# Patient Record
Sex: MALE
[De-identification: names, ages, dates, MRNs, and addresses within clinical notes are randomized per-mention and may not be internally consistent; named-entity substitution may affect disease eponyms.]

---

## 2018-07-30 ENCOUNTER — HOSPITAL ENCOUNTER (INPATIENT)
Dept: HOSPITAL 5 - ED | Age: 73
LOS: 1 days | Discharge: HOME | DRG: 69 | End: 2018-07-31
Attending: INTERNAL MEDICINE | Admitting: INTERNAL MEDICINE
Payer: MEDICARE

## 2018-07-30 DIAGNOSIS — Z90.89: ICD-10-CM

## 2018-07-30 DIAGNOSIS — Z79.899: ICD-10-CM

## 2018-07-30 DIAGNOSIS — G45.9: Primary | ICD-10-CM

## 2018-07-30 DIAGNOSIS — M17.0: ICD-10-CM

## 2018-07-30 DIAGNOSIS — Z91.040: ICD-10-CM

## 2018-07-30 DIAGNOSIS — I69.351: ICD-10-CM

## 2018-07-30 DIAGNOSIS — E87.1: ICD-10-CM

## 2018-07-30 LAB
APTT BLD: 32.7 SEC. (ref 24.2–36.6)
BASOPHILS # (AUTO): 0.1 K/MM3 (ref 0–0.1)
BASOPHILS NFR BLD AUTO: 1.2 % (ref 0–1.8)
BUN SERPL-MCNC: 16 MG/DL (ref 9–20)
BUN/CREAT SERPL: 20 %
CALCIUM SERPL-MCNC: 9.2 MG/DL (ref 8.4–10.2)
EOSINOPHIL # BLD AUTO: 0.1 K/MM3 (ref 0–0.4)
EOSINOPHIL NFR BLD AUTO: 1.3 % (ref 0–4.3)
HCT VFR BLD CALC: 42.3 % (ref 35.5–45.6)
HEMOLYSIS INDEX: 21
HGB BLD-MCNC: 15.7 GM/DL (ref 11.8–15.2)
INR PPP: 0.94 (ref 0.87–1.13)
LYMPHOCYTES # BLD AUTO: 1.3 K/MM3 (ref 1.2–5.4)
LYMPHOCYTES NFR BLD AUTO: 24.5 % (ref 13.4–35)
MCH RBC QN AUTO: 34 PG (ref 28–32)
MCHC RBC AUTO-ENTMCNC: 37 % (ref 32–34)
MCV RBC AUTO: 91 FL (ref 84–94)
MONOCYTES # (AUTO): 0.4 K/MM3 (ref 0–0.8)
MONOCYTES % (AUTO): 6.7 % (ref 0–7.3)
PLATELET # BLD: 152 K/MM3 (ref 140–440)
RBC # BLD AUTO: 4.64 M/MM3 (ref 3.65–5.03)

## 2018-07-30 PROCEDURE — 70544 MR ANGIOGRAPHY HEAD W/O DYE: CPT

## 2018-07-30 PROCEDURE — 85025 COMPLETE CBC W/AUTO DIFF WBC: CPT

## 2018-07-30 PROCEDURE — 85730 THROMBOPLASTIN TIME PARTIAL: CPT

## 2018-07-30 PROCEDURE — 85610 PROTHROMBIN TIME: CPT

## 2018-07-30 PROCEDURE — 85670 THROMBIN TIME PLASMA: CPT

## 2018-07-30 PROCEDURE — 82962 GLUCOSE BLOOD TEST: CPT

## 2018-07-30 PROCEDURE — 36415 COLL VENOUS BLD VENIPUNCTURE: CPT

## 2018-07-30 PROCEDURE — 70450 CT HEAD/BRAIN W/O DYE: CPT

## 2018-07-30 PROCEDURE — 80048 BASIC METABOLIC PNL TOTAL CA: CPT

## 2018-07-30 PROCEDURE — 93880 EXTRACRANIAL BILAT STUDY: CPT

## 2018-07-30 PROCEDURE — 93005 ELECTROCARDIOGRAM TRACING: CPT

## 2018-07-30 PROCEDURE — 95819 EEG AWAKE AND ASLEEP: CPT

## 2018-07-30 PROCEDURE — 70551 MRI BRAIN STEM W/O DYE: CPT

## 2018-07-30 PROCEDURE — 93306 TTE W/DOPPLER COMPLETE: CPT

## 2018-07-30 PROCEDURE — 80061 LIPID PANEL: CPT

## 2018-07-30 PROCEDURE — 84484 ASSAY OF TROPONIN QUANT: CPT

## 2018-07-30 PROCEDURE — 93010 ELECTROCARDIOGRAM REPORT: CPT

## 2018-07-30 NOTE — HISTORY AND PHYSICAL REPORT
History of Present Illness


Chief complaint: 





I felt weak


History of present illness: 


73 YO Male with CVA, OA presents to ED for evaluation. Pt states that he 

experienced acute onset of blurred vision, Facial weakness, slurred speech, 

right arm and right leg weakness. Pt symptoms resolved after about 30 minutes. 

Pt subsequently transported to [facility] for further care and evaluation. Pt denies 

fever, chills, CP, Palpitations, NVD, Syncope, Trauma, Seizure, loss of bowel 

or bladder continence. Pt seen and evaluated in ED. A code stroke was called. 

Pt found to have symptoms consistent with CVA. Pt outside therapeutic window 

for TPA. Pt admitted to telemetry and initiated on CVA protocol. 














Past History


Past Medical History: arthritis, stroke


Past Surgical History: tonsillectomy, Other (Left Femur surgery)


Social history: , lives with family


Family history: no significant family history (reviewed)





Medications and Allergies


 Allergies











Allergy/AdvReac Type Severity Reaction Status Date / Time


 


latex Allergy  Unknown Verified 07/30/18 12:44











 Home Medications











 Medication  Instructions  Recorded  Confirmed  Last Taken  Type


 


Lisinopril [Zestril TAB] 10 mg PO QDAY 07/30/18 07/30/18 07/30/18 History


 


Multivitamin [Multiple Vitamins] 1 each PO DAILY 07/30/18 07/30/18 07/30/18 

History














Review of Systems


Constitutional: no weight loss, no weight gain, no fever, no chills


Ears, nose, mouth and throat: no ear pain, no ear discharge, no tinnitis, no 

decreased hearing, no nose pain, no nasal congestion


Cardiovascular: no orthopnea, no palpitations, no rapid/irregular heart beat, 

no edema, no syncope, no lightheadedness


Respiratory: no cough, no cough with sputum, no excessive sputum, no hemoptysis

, no shortness of breath


Gastrointestinal: no abdominal pain, no nausea, no vomiting, no diarrhea, no 

constipation


Genitourinary Male: no hematuria, no flank pain, no discharge, no urinary 

frequency, no urinary hesitancy


Rectal: no pain, no incontinence, no bleeding


Musculoskeletal: no neck stiffness, no neck pain, no shooting arm pain, no arm 

numbness/tingling, no low back pain, no shooting leg pain


Integumentary: no rash, no pruritis, no redness, no sores, no wounds


Neurological: weakness, ataxia, change in speech, gait dysfunction, motor 

disturbance, no head injury, no seizures, no vertigo, no headaches, no migraines


Psychiatric: no anxiety, no memory loss, no change in sleep habits, no sleep 

disturbances, no insomnia, no hypersomnia, no change in appetite


Endocrine: no cold intolerance, no heat intolerance, no polyphagia, no 

excessive thirst, no polydipsia, no polyuria, no nocturia


Hematologic/Lymphatic: no easy bruising, no easy bleeding, no lymphadenopathy, 

no lymphedema


Allergic/Immunologic: no urticaria, no allergic rhinitis, no wheezing, no 

persistent infections, no anaphylaxis





Exam





- Constitutional


Vitals: 


 











Temp Pulse Resp BP Pulse Ox


 


 97.9 F   83   18   178/91   98 


 


 07/30/18 12:35  07/30/18 12:35  07/30/18 12:35  07/30/18 12:35  07/30/18 12:47











General appearance: Present: no acute distress, well-nourished





- EENT


Eyes: Present: PERRL


ENT: hearing intact, clear oral mucosa





- Neck


Neck: Present: supple, normal ROM





- Respiratory


Respiratory effort: normal


Respiratory: bilateral: CTA





- Cardiovascular


Heart Sounds: Present: S1 & S2.  Absent: rub, click





- Extremities


Extremities: pulses symmetrical, No edema


Peripheral Pulses: within normal limits





- Abdominal


General gastrointestinal: Present: soft, non-tender, non-distended, normal 

bowel sounds


Male genitourinary: Present: normal





- Integumentary


Integumentary: Present: clear, warm, dry





- Musculoskeletal


Musculoskeletal: right sided weakness





- Psychiatric


Psychiatric: appropriate mood/affect, intact judgment & insight





- Neurologic


Neurologic: CNII-XII intact, moves all extremities





Results





- Labs


CBC & Chem 7: 


 07/30/18 12:40





 07/30/18 12:40


Labs: 


 Abnormal lab results











  07/30/18 07/30/18 07/30/18 Range/Units





  12:39 12:40 12:40 


 


Hgb   15.7 H   (11.8-15.2)  gm/dl


 


MCH   34 H   (28-32)  pg


 


MCHC   37 H   (32-34)  %


 


Sodium    130 L  (137-145)  mmol/L


 


Chloride    94.1 L  ()  mmol/L


 


Carbon Dioxide    21 L  (22-30)  mmol/L


 


Glucose    146 H  ()  mg/dL


 


POC Glucose  122 H    ()  














Assessment and Plan





- Patient Problems


(1) CVA (cerebral vascular accident)


Current Visit: Yes   Status: Acute   


Qualifiers: 


   Precerebral and cerebral artery: middle cerebral artery   Laterality of 

affected vessel: left 


Plan to address problem: 


Stroke protocol: CT Brain, MRI Brain, MRA Brain, Echo, Carotid doppler, PT/OT/ 

Speech Therapy, Antiplatelet therapy, lipid panel, neuro checks, 








(2) Arthritis


Current Visit: Yes   Status: Acute   


Plan to address problem: 


Pain control, supportive care. 








(3) Hyponatremia syndrome


Current Visit: Yes   Status: Acute   


Plan to address problem: 


IVF resuscitation, repeat bmp. 








(4) DVT prophylaxis


Current Visit: Yes   Status: Acute   


Plan to address problem: 


SCD to BLE while in bed.

## 2018-07-30 NOTE — EMERGENCY DEPARTMENT REPORT
ED Neuro Deficit HPI





- General


Chief Complaint: Neuro Symptoms/Deficit


Stated Complaint: POSSIBLE STROKE


Time Seen by Provider: 07/30/18 12:55


Source: patient, family


Mode of arrival: Ambulatory


Limitations: No Limitations





- History of Present Illness


Initial Comments: 





This is a 72-year-old male who is unknown to this provider previously, has a 

past medical history of stroke, death in the right ear, mild residual right-

sided weakness.


His primary care doctor is Dr. Phil Kelsey.


He presents to the ER with a complaint of resolved speech disturbance, resolved 

slurred speech, and resolved weakness in the right upper extremity, and 

resolved numbness in the right lower extremity.  He has no complaints at this 

time.








-: Sudden


Location: speech, right arm, right leg


Presenting Symptoms: Present: Weak/Paralyzed One Side, Unable to Speak Clearly.

  Absent: Sudden, Severe Headache, Blurred/Loss of Vision, Facial Droop/Numbness

, Altered Mental Status


History of same: Yes


Place: home


Severity: moderate


Improves With: none


Worsens With: none


On Anticoagulants: Yes


Context: sudden onset


Associated Symptoms: weakness.  denies: confusion, chest pain, cough, 

diaphoresis, fever/chills, headaches, loss of appetite, malise, nausea/vomiting

, vertigo, seizures, shortness of breath, syncope





- Related Data


Allergies/Adverse Reactions: 


 Allergies











Allergy/AdvReac Type Severity Reaction Status Date / Time


 


latex Allergy  Unknown Verified 07/30/18 12:44














ED Review of Systems


ROS: 


Stated complaint: POSSIBLE STROKE


Other details as noted in HPI





Constitutional: denies: fever


Eyes: denies: eye discharge


ENT: denies: epistaxis


Respiratory: denies: cough


Cardiovascular: denies: chest pain


Genitourinary: denies: dysuria


Musculoskeletal: denies: back pain


Skin: denies: lesions


Neurological: weakness


Psychiatric: anxiety





ED Past Medical Hx





- Past Medical History


Hx CVA: Yes (2008- mild weakness right side)


Hx Arthritis: Yes (bilateral knees)


Additional medical history: deaf right ear,hearing aid left ear





- Surgical History


Additional Surgical History: left femur,tonsillectomy





- Social History


Smoking Status: Never Smoker


Substance Use Type: None





ED Neuro Physical Exam





- General


Limitations: No Limitations


General appearance: alert, in no apparent distress


Suspected Stroke: Yes





- Head


Head exam: Present: atraumatic, normocephalic





- Eye


Eye exam: Present: normal appearance, EOMI.  Absent: nystagmus





- ENT


ENT exam: Present: normal exam, normal orophraynx, mucous membranes moist, 

normal external ear exam





- Neck


Neck exam: Present: normal inspection, full ROM.  Absent: tenderness, 

meningismus





- Respiratory


Respiratory exam: Present: normal lung sounds bilaterally.  Absent: respiratory 

distress





- Cardiovascular


Cardiovascular Exam: Present: regular rate, normal rhythm, normal heart sounds.

  Absent: bradycardia, tachycardia, irregular rhythm, systolic murmur, 

diastolic murmur, rubs, gallop





- GI/Abdominal


GI/Abdominal exam: Present: soft.  Absent: distended, tenderness, guarding, 

rebound, rigid, pulsatile mass





- Rectal


Rectal exam: Present: deferred





- Extremities Exam


Extremities exam: Present: normal inspection, full ROM, normal capillary refill

, other (2+ pulses noted in the bilateral upper, lower extremities.  

Compartments soft.  No long bony tenderness.  The pelvis is stable.).  Absent: 

tenderness, pedal edema, joint swelling, calf tenderness





- Back Exam


Back exam: Present: normal inspection, full ROM.  Absent: tenderness, CVA 

tenderness (R), paraspinal tenderness, vertebral tenderness





- Neurological Exam


Neurological exam: Present: alert, oriented X3, CN II-XII intact, normal gait, 

other (Extraocular movements intact.  Tongue midline.  No facial droop.  Facial 

sensation intact to light touch in the V1, V2, V3 distribution bilaterally.  5 

and 5 strength in 4 extremities..  Sensation is intact to light touch in 4 

extremities.).  Absent: motor sensory deficit





- NIHSS


Assessment Interval: Baseline


1a. Level of Consciousness: alert


1b. LOC Questions: answers correctly


1c. LOC Commands: performs tasks correctly


2. Best Gaze: normal


3. Visual: no visual loss


4. Facial Palsy: normal symmetrical movement


5b. Motor Arm Right: no drift


5a. Motor Arm Left: no drift


6a. Motor Leg Left: no drift


6b. Motor Leg Right: no drift


7. Limb Ataxia: absent


8. Sensory: normal


9. Best Language: no aphasia


10. Dysarthria: normal


11. Extinction/Inattention: no abnormality


Total Score: 0


Stroke Severity: No Stroke Symptoms





- Psychiatric


Psychiatric exam: Present: normal affect, normal mood





- Skin


Skin exam: Present: warm, dry, intact, normal color.  Absent: rash





ED Course


 Vital Signs











  07/30/18 07/30/18 07/30/18





  12:35 12:46 12:47


 


Temperature 97.9 F  


 


Pulse Rate 83  


 


Respiratory 18  





Rate   


 


Blood Pressure 178/91  


 


O2 Sat by Pulse 98 97 98





Oximetry   














- Reevaluation(s)


Reevaluation #1: 





07/30/18 14:10


Dr. Schwartz accepted  the patient to the medical service.


Reevaluation #2: 





07/30/18 14:19

















07/30/18 14:20








Case is discussed with consult to neurology, Dr. ORTIZ Arriola, who is in agreement 

that the patient does not meet TPA criteria and does not require emergent 

endovascular intervention.  He does recommend every 2 hours neuro checks for 

the next 12 hours.








- Lab Data


Result diagrams: 


 07/30/18 12:40





 07/30/18 12:40


 Lab Results











  07/30/18 07/30/18 07/30/18 Range/Units





  12:39 12:40 12:40 


 


WBC   5.3   (4.5-11.0)  K/mm3


 


RBC   4.64   (3.65-5.03)  M/mm3


 


Hgb   15.7 H   (11.8-15.2)  gm/dl


 


Hct   42.3   (35.5-45.6)  %


 


MCV   91   (84-94)  fl


 


MCH   34 H   (28-32)  pg


 


MCHC   37 H   (32-34)  %


 


RDW   13.5   (13.2-15.2)  %


 


Plt Count   152   (140-440)  K/mm3


 


Lymph % (Auto)   24.5   (13.4-35.0)  %


 


Mono % (Auto)   6.7   (0.0-7.3)  %


 


Eos % (Auto)   1.3   (0.0-4.3)  %


 


Baso % (Auto)   1.2   (0.0-1.8)  %


 


Lymph #   1.3   (1.2-5.4)  K/mm3


 


Mono #   0.4   (0.0-0.8)  K/mm3


 


Eos #   0.1   (0.0-0.4)  K/mm3


 


Baso #   0.1   (0.0-0.1)  K/mm3


 


Seg Neutrophils %   66.3   (40.0-70.0)  %


 


Seg Neutrophils #   3.5   (1.8-7.7)  K/mm3


 


PT    13.0  (12.2-14.9)  Sec.


 


INR    0.94  (0.87-1.13)  


 


APTT    32.7  (24.2-36.6)  Sec.


 


Thrombin Time    17.2  (15.1-19.6)  Sec.


 


Sodium     (137-145)  mmol/L


 


Potassium     (3.6-5.0)  mmol/L


 


Chloride     ()  mmol/L


 


Carbon Dioxide     (22-30)  mmol/L


 


Anion Gap     mmol/L


 


BUN     (9-20)  mg/dL


 


Creatinine     (0.8-1.5)  mg/dL


 


Estimated GFR     ml/min


 


BUN/Creatinine Ratio     %


 


Glucose     ()  mg/dL


 


POC Glucose  122 H    ()  


 


Calcium     (8.4-10.2)  mg/dL


 


Troponin T     (0.00-0.029)  ng/mL














  07/30/18 Range/Units





  12:40 


 


WBC   (4.5-11.0)  K/mm3


 


RBC   (3.65-5.03)  M/mm3


 


Hgb   (11.8-15.2)  gm/dl


 


Hct   (35.5-45.6)  %


 


MCV   (84-94)  fl


 


MCH   (28-32)  pg


 


MCHC   (32-34)  %


 


RDW   (13.2-15.2)  %


 


Plt Count   (140-440)  K/mm3


 


Lymph % (Auto)   (13.4-35.0)  %


 


Mono % (Auto)   (0.0-7.3)  %


 


Eos % (Auto)   (0.0-4.3)  %


 


Baso % (Auto)   (0.0-1.8)  %


 


Lymph #   (1.2-5.4)  K/mm3


 


Mono #   (0.0-0.8)  K/mm3


 


Eos #   (0.0-0.4)  K/mm3


 


Baso #   (0.0-0.1)  K/mm3


 


Seg Neutrophils %   (40.0-70.0)  %


 


Seg Neutrophils #   (1.8-7.7)  K/mm3


 


PT   (12.2-14.9)  Sec.


 


INR   (0.87-1.13)  


 


APTT   (24.2-36.6)  Sec.


 


Thrombin Time   (15.1-19.6)  Sec.


 


Sodium  130 L  (137-145)  mmol/L


 


Potassium  4.5  (3.6-5.0)  mmol/L


 


Chloride  94.1 L  ()  mmol/L


 


Carbon Dioxide  21 L  (22-30)  mmol/L


 


Anion Gap  19  mmol/L


 


BUN  16  (9-20)  mg/dL


 


Creatinine  0.8  (0.8-1.5)  mg/dL


 


Estimated GFR  > 60  ml/min


 


BUN/Creatinine Ratio  20  %


 


Glucose  146 H  ()  mg/dL


 


POC Glucose   ()  


 


Calcium  9.2  (8.4-10.2)  mg/dL


 


Troponin T  < 0.010  (0.00-0.029)  ng/mL














- EKG Data


-: EKG Interpreted by Me


EKG shows normal: sinus rhythm, intervals, QRS complexes


When compared to previous EKG there are: previous EKG unavailable





- Radiology Data


Radiology results: report reviewed, image reviewed


Noncontrast CT scan of the brain is negative for acute disease





- Medical Decision Making





Differential diagnosis, including but not limited to: Transient ischemic attack

, stroke, Mino's paralysis











Assessment and plan: 72-year-old male with resolved right-sided numbness, 

weakness and resolved slurred speech.  Has an NIH score of 0.  Therefore does 

not require TPA.  Also, given his resolution of symptoms and his normal 

examination at this time, does not require emergent endovascular imaging at 

this time.  Patient has a high vascular risk profile, and will be admitted to 

the hospital for presumed transient ischemic attack.  The hospital physician is 

paged to arrange admission.








- Core Measures


Measure Exclusions: not indicated





- Thrombolytic Inclusion/Exclusion


Thrombolytic Contraindications: Rapidily Improving s/s


Critical care attestation.: 


If time is entered above; I have spent that time in minutes in the direct care 

of this critically ill patient, excluding procedure time.








ED Disposition


Clinical Impression: 


 TIA (transient ischemic attack)





Disposition: DC-09 OP ADMIT IP TO THIS HOSP


Is pt being admited?: Yes


Does the pt Need Aspirin: Yes


Condition: Good


Referrals: 


PHIL KELSEY MD [Primary Care Provider] - 3-5 Days

## 2018-07-30 NOTE — CAT SCAN REPORT
FINAL REPORT



EXAM:  CT HEAD/BRAIN WO CON



HISTORY:  neuro deficits &lt; 6hrs or sx present upon awakening





TECHNIQUE:  CT of the head was performed.  No intravenous

contrast was administered.





PRIORS:  None.



FINDINGS:  

There is an old left thalamic lacunar infarct.



There is no evidence of intracranial hemorrhage.



There is no edema, mass effect or midline shift.



There are no abnormal extra-axial fluid collections.



The ventricles are appropriate for brain volume.



There is no skull fracture seen.



The visualized aspects of the sinuses are clear.



IMPRESSION:  

There is no acute intracranial abnormality identified.

## 2018-07-31 VITALS — SYSTOLIC BLOOD PRESSURE: 121 MMHG | DIASTOLIC BLOOD PRESSURE: 73 MMHG

## 2018-07-31 LAB — HDLC SERPL-MCNC: 34 MG/DL (ref 40–59)

## 2018-07-31 NOTE — DISCHARGE SUMMARY
Providers





- Providers


Date of Admission: 


07/30/18 14:07





Date of discharge: 07/31/18


Attending physician: 


LIBRADO ZARAGOZA





 





07/30/18 12:57


Consult to Physician [CONS] Urgent 


   Comment: 


   Consulting Provider: ALMA SPRINGER


   Physician Instructions: 


   Reason For Exam: tia





07/30/18 14:07


Occupational Therapy Evaluate and Treat [CONS] Routine 


   Comment: 


   Reason For Exam: Neuro deficits


Physical Therapy Evaluation and Treat [CONS] Routine 


   Comment: 


   Reason For Exam: Neuro deficits





07/30/18 14:08


Speech Therapy Evaluation and Treat [CONS] Routine 


   Reason For Exam: swallow eval











Primary care physician: 


GIOVANI KELSEY








Hospitalization


Condition: Good


Disposition: DC-01 TO HOME OR SELFCARE





- Discharge Diagnoses


(1) TIA (transient ischemic attack)


Status: Acute   





Core Measure Documentation





- Palliative Care


Palliative Care/ Comfort Measures: Not Applicable





- Core Measures


Any of the following diagnoses?: none





Exam





- Constitutional


Vitals: 


 











Temp Pulse Resp BP Pulse Ox


 


 97.9 F   67   20   121/73   97 


 


 07/31/18 09:02  07/31/18 09:02  07/31/18 09:02  07/31/18 09:02  07/31/18 10:00














Plan


Activity: no restrictions


Diet: low fat, low cholesterol


Additional Instructions: 1.Follow up with PCP in 1 week.


Follow up with: 


GIOVANI KELSEY MD [Primary Care Provider] - 3-5 Days


Prescriptions: 


Aspirin EC [Aspirin Enteric Coated TAB] 325 mg PO QDAY #30 tablet.

## 2018-07-31 NOTE — MAGNETIC RESONANCE REPORT
MRI OF THE BRAIN WITHOUT CONTRAST:



HISTORY: Stroke



PROCEDURE:

Multiplanar, multisequence MR imaging of the brain without IV contrast 

was

performed.  



FINDINGS:

CT head performed 7/30/18 was reviewed. MRI demonstrates mild cortical 

volume loss and mild nonspecific chronic white matter changes which are 

probably appropriate for this persons age. 7 mm chronic lacunar infarct 

in the posterior left thalamus is also noted.



No evidence for acute ischemia, hemorrhage or mass.  No extra-axial 

fluid collection.



The midline structures are central.  The basal cisterns are patent. 

Normal ventricular size.



The orbital cavities and sella turcica demonstrate no abnormality.



The visualized paranasal sinuses and mastoid air cells are well aerated.



IMPRESSION:

No acute process.

Mild volume loss and chronic white matter changes.

Chronic lacunar infarct in the left thalamus.

## 2018-07-31 NOTE — MAGNETIC RESONANCE REPORT
MRA HEAD WITHOUT CONTRAST



HISTORY: Stroke.



Time-of-flight imaging with MIP reformations of the [place]

[place] is submitted.



The arteries appear widely patent and free of hemodynamically

significant stenosis, aneurysm or dissection.  Fetal origin of the 

right PCA is noted.



IMPRESSION:

Normal variant MRA head. No hemodynamically significant stenosis or 

occlusion is identified.

## 2018-09-17 ENCOUNTER — HOSPITAL ENCOUNTER (OUTPATIENT)
Dept: HOSPITAL 5 - OR | Age: 73
Discharge: HOME | End: 2018-09-17
Attending: UROLOGY
Payer: MEDICARE

## 2018-09-17 VITALS — DIASTOLIC BLOOD PRESSURE: 86 MMHG | SYSTOLIC BLOOD PRESSURE: 159 MMHG

## 2018-09-17 DIAGNOSIS — Z87.891: ICD-10-CM

## 2018-09-17 DIAGNOSIS — M19.90: ICD-10-CM

## 2018-09-17 DIAGNOSIS — I10: ICD-10-CM

## 2018-09-17 DIAGNOSIS — Z79.899: ICD-10-CM

## 2018-09-17 DIAGNOSIS — Z91.040: ICD-10-CM

## 2018-09-17 DIAGNOSIS — Z79.82: ICD-10-CM

## 2018-09-17 DIAGNOSIS — Z86.73: ICD-10-CM

## 2018-09-17 DIAGNOSIS — E78.00: ICD-10-CM

## 2018-09-17 DIAGNOSIS — N13.2: Primary | ICD-10-CM

## 2018-09-17 DIAGNOSIS — N40.0: ICD-10-CM

## 2018-09-17 DIAGNOSIS — E11.9: ICD-10-CM

## 2018-09-17 DIAGNOSIS — K21.9: ICD-10-CM

## 2018-09-17 DIAGNOSIS — Z98.890: ICD-10-CM

## 2018-09-17 PROCEDURE — C2617 STENT, NON-COR, TEM W/O DEL: HCPCS

## 2018-09-17 PROCEDURE — C1726 CATH, BAL DIL, NON-VASCULAR: HCPCS

## 2018-09-17 PROCEDURE — 74420 UROGRAPHY RTRGR +-KUB: CPT

## 2018-09-17 PROCEDURE — A4217 STERILE WATER/SALINE, 500 ML: HCPCS

## 2018-09-17 PROCEDURE — 52356 CYSTO/URETERO W/LITHOTRIPSY: CPT

## 2018-09-17 PROCEDURE — C1769 GUIDE WIRE: HCPCS

## 2018-09-17 PROCEDURE — C1758 CATHETER, URETERAL: HCPCS

## 2018-09-17 PROCEDURE — 82962 GLUCOSE BLOOD TEST: CPT

## 2018-09-17 NOTE — ANESTHESIA CONSULTATION
Anesthesia Consult and Med Hx


Date of service: 09/17/18





- Airway


Anesthetic Teeth Evaluation: Dentures


ROM Head & Neck: Adequate


Mallampati Class: Class II


Intubation Access Assessment: Probably Good





- Pulmonary Exam


CTA: Yes





- Cardiac Exam


Cardiac Exam: RRR





- Pre-Operative Health Status


ASA Pre-Surgery Classification: ASA3


Proposed Anesthetic Plan: General





- Pre-Anesthesia Comment


Pre-Anesthesia Comments: micrognathia, redundant tissue; very active- yard work

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





- Pulmonary


Hx Smoking: Yes (STOPPED X 40 YRS, 3PPD X 20 YRS)


Hx Asthma: No


Hx Respiratory Symptoms: No


SOB: No


COPD: No


Home Oxygen Therapy: No


Hx Pneumonia: No


Hx Sleep Apnea: No (NATY PRE SCREEN HIGH RISK)





- Cardiovascular System


Hx Hypertension: Yes (X 8 YRS; hyperlipidemia)


Hx Coronary Artery Disease: No


Hx Heart Attack/AMI: No


Hx Angina: No


Hx Percutaneous Transluminal Coronary Angioplasty (PTCA): No


Hx Cardia Arrhythmia: No


Hx Pacemaker: No


Hx Internal Defibrillator: No


Hx Valvular Heart Disease: No


Hx Heart Murmur: No





- Central Nervous System


CVA: Yes (;occasionally uses a cane)


Hx Back Pain: No


Hx Psychiatric Problems: No





- Gastrointestinal


Hx Gastroesophageal Reflux Disease: No (food related but very rare)





- Endocrine


Hx Renal Disease: No


Hx End Stage Renal Disease: No


Hx Cirrhosis: No


Hx Liver Disease: No


Hx Insulin Dependent Diabetes: No


Hx Non-Insulin Dependent Diabetes: No


Hx Thyroid Disease: No


Hx Hypothyroidism: No


Hx Hyperthyroidism: No





- Hematic


Hx Anemia: No


Hx Sickle Cell Disease: No





- Other Systems


Hx Alcohol Use: No


Hx Substance Use: No


Hx Cancer: No


Hx Obesity: No

## 2018-09-17 NOTE — POST OPERATIVE NOTE
Date of procedure: 09/17/18


Pre-op diagnosis: left ureteralstone 


Post-op diagnosis: same


Findings: 





as above 


Procedure: 





cysto ureteroscopy laser extraxction 


Anesthesia: GETA


Surgeon: ROSA TOMLINSON


Estimated blood loss: none


Pathology: list (stone)


Specimen disposition: given to patient/family


Condition: stable


Disposition: PACU

## 2018-09-17 NOTE — DISCHARGE SUMMARY
Short Stay Discharge Plan


Activity: other (no straining )


Weight Bearing Status: Full Weight Bearing


Diet: low fat, low salt


Special Instructions: other (has stent )


Durable Medical Equipment Needed Upon Discharge: other (stent)


Follow up with: 


GIOVANI KELSEY MD [Primary Care Provider] - 7 Days


ROSA TOMLINSON MD [Staff Physician] - 7 Days

## 2018-09-17 NOTE — OPERATIVE REPORT
PREOPERATIVE DIAGNOSIS:  Large left distal ureteral stone.



POSTOPERATIVE DIAGNOSIS:  Large left distal ureteral stone.



PROCEDURES:  Cystoscopy, left retrograde, left ureteroscopy, laser of stone,

stone extraction, J stent.



SURGEON:  Raghu Winn MD



ANESTHESIA:  General.



FINDINGS:  The gentleman with a large stone, left hydronephrosis.  Minimal pain.

 The stone is over 8 mm in the distal ureter, now presents for treatment.



DESCRIPTION OF PROCEDURE:  The patient brought to the operating room and placed

on the operating table.  Following induction of anesthesia, placed in lithotomy

position, prepped and draped in usual sterile fashion.  Cystoscopy showed no

bladder tumors.  Urethra was normal.  Retrograde showed a large stone in the

distal ureter.  The balloon dilatation was carried out.  Stone was way too big

to extract.  It had multiple spicules on it.  With the laser, we broke it into

about 10 pieces and they were extracted.  The patient tolerated the procedure

well.  A 6-[place] double J coiled in the kidney.  The patient tolerated the

procedure well and brought to recovery in stable condition.  Stone will be given

to the family.  Family notified.





DD: 09/17/2018 09:26

DT: 09/17/2018 09:52

JOB# [id number]  [id number]

MILAN/GEN

## 2018-09-18 NOTE — FLUOROSCOPY REPORT
FLUOROSCOPY RETROGRADE UROGRAPHY:

FLUOROSCOPY URETER/NEPHROSTOMY DILATATION LEFT:



HISTORY: Left ureteral stone.



FINDINGS: Fluoroscopy was provided by radiology during retrograde 

urography by the urologist. 6 fluoroscopic images were captured.



The images demonstrate an approximate 9 mm distal left ureteral stone. 

Left ureteroscopy was performed. A holmium laser was utilized. Stone 

fragments were removed with a grasper. A left ureteral stent was placed 

which adequately drains the left collecting system. No images of the 

right pyelogram are presented.



IMPRESSION: Distal left ureteral stone removal. Left ureteral stent 

placement.

## 2019-07-17 ENCOUNTER — HOSPITAL ENCOUNTER (EMERGENCY)
Dept: HOSPITAL 5 - ED | Age: 74
LOS: 1 days | Discharge: HOME | End: 2019-07-18
Payer: MEDICARE

## 2019-07-17 DIAGNOSIS — Z87.891: ICD-10-CM

## 2019-07-17 DIAGNOSIS — Z79.899: ICD-10-CM

## 2019-07-17 DIAGNOSIS — Z79.82: ICD-10-CM

## 2019-07-17 DIAGNOSIS — Z88.8: ICD-10-CM

## 2019-07-17 DIAGNOSIS — Z90.89: ICD-10-CM

## 2019-07-17 DIAGNOSIS — Z86.73: ICD-10-CM

## 2019-07-17 DIAGNOSIS — Z91.040: ICD-10-CM
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
## 2019-07-17 DIAGNOSIS — T78.3XXA: Primary | ICD-10-CM

## 2019-07-17 DIAGNOSIS — E11.9: ICD-10-CM

## 2019-07-17 DIAGNOSIS — Z87.442: ICD-10-CM

## 2019-07-17 DIAGNOSIS — M19.90: ICD-10-CM

## 2019-07-17 DIAGNOSIS — I10: ICD-10-CM

## 2019-07-17 PROCEDURE — 85025 COMPLETE CBC W/AUTO DIFF WBC: CPT

## 2019-07-17 PROCEDURE — 80048 BASIC METABOLIC PNL TOTAL CA: CPT

## 2019-07-17 PROCEDURE — 96374 THER/PROPH/DIAG INJ IV PUSH: CPT

## 2019-07-17 PROCEDURE — 36415 COLL VENOUS BLD VENIPUNCTURE: CPT

## 2019-07-17 PROCEDURE — 99283 EMERGENCY DEPT VISIT LOW MDM: CPT

## 2019-07-17 PROCEDURE — 96375 TX/PRO/DX INJ NEW DRUG ADDON: CPT

## 2019-07-17 NOTE — EMERGENCY DEPARTMENT REPORT
ED Allergic Reaction HPI





- General


Stated complaint: ALLERGIC REACTION


Time Seen by Provider: 07/17/19 23:24





- History of Present Illness


Initial Comments: 





Patient is 73 years old male with history of CVA, hypertension and diabetes.  

Patient presented to the ER complaining of lower lip swelling started this 

evening.  Patient denied any shortness of breath or difficulty swallowing.  

Patient stated the symptoms started after he started drinking his fluid 

preparation for his colonoscopy tomorrow.  Patient also taking lisinopril for 

hypertension.  Patient is alert, oriented and in no acute distress.


MD Complaint: allergic reaction, facial swelling


Exposure: medication


Symptoms: lip swelling


Severity: moderate


Treatment Prior to Arrival: none


Previous Allergy History: none





- Related Data


                                Home Medications











 Medication  Instructions  Recorded  Confirmed  Last Taken


 


Lisinopril [Zestril TAB] 20 mg PO PRN PRN 07/30/18 09/14/18 09/16/18


 


Multivitamin [Multiple Vitamins] 1 each PO DAILY 07/30/18 09/17/18 09/13/18


 


Acetaminophen [Tylenol Extra 500 mg PO PRN PRN 09/14/18 09/17/18 09/14/18





Strength]    


 


Aspirin EC 81 mg PO QDAY 09/14/18 09/14/18 09/13/18


 


Cholecalciferol (Vitamin D3) 1,000 unit PO DAILY 09/14/18 09/14/18 09/13/18





[Vitamin D3]    


 


Cranberry Conc/Ascorbic Acid 1 each PO DAILY 09/14/18 09/14/18 09/13/18





[Cranberry Concentrate Softgel]    


 


Diazepam [Valium] 5 mg PO PRN PRN 09/14/18 09/17/18 09/12/18


 


Diclofenac Dr [Voltaren Dr] 75 mg PO DAILY 09/14/18 09/14/18 09/13/18


 


Omega-3/Dha/Epa/Fish Oil [Fish Oil 1 cap PO DAILY 09/14/18 09/14/18 09/13/18





1,000 mg Softgel]    


 


Vitamin B Complex [Super B-50 1 each PO DAILY 09/14/18 09/14/18 09/13/18





Complex]    








                                  Previous Rx's











 Medication  Instructions  Recorded  Last Taken  Type


 


AtorvaSTATin [Lipitor] 20 mg PO QHS #30 tab 07/31/18 09/16/18 Rx











                                    Allergies











Allergy/AdvReac Type Severity Reaction Status Date / Time


 


bisacodyl [From Biscolax] Allergy  Angioedema Verified 07/17/19 23:28


 


latex Allergy  SKIN Verified 09/14/18 13:07





   BLISTERS  


 


lisinopril Allergy  Angioedema Verified 07/17/19 23:28














ED Review of Systems


ROS: 


Stated complaint: ALLERGIC REACTION


Other details as noted in HPI





Comment: All other systems reviewed and negative


Constitutional: denies: chills, fever


Respiratory: denies: cough, orthopnea, shortness of breath, SOB with exertion, 

SOB at rest, wheezing


Cardiovascular: denies: chest pain, palpitations, dyspnea on exertion


Gastrointestinal: denies: abdominal pain, nausea, vomiting, diarrhea, 

constipation, hematemesis


Genitourinary: denies: urgency


Musculoskeletal: denies: back pain


Neurological: denies: headache, weakness, numbness, paresthesias, confusion, 

abnormal gait





ED Past Medical Hx





- Past Medical History


Hx Hypertension: Yes (X 8 YRS; hyperlipidemia)


Hx CVA: Yes (2008- mild weakness right side)


Hx Heart Attack/AMI: No


Hx Diabetes: Yes (DIET CONTROLLED)


Hx Liver Disease: No


Hx Renal Disease: No


Hx Sickle Cell Disease: No


Hx Arthritis: Yes


Hx Kidney Stones: Yes


Hx Asthma: No


Hx COPD: No


Hx HIV: No


Additional medical history: deaf right ear,hearing aid left ear





- Surgical History


Hx Pacemaker: No


Hx Internal Defibrillator: No


Additional Surgical History: left femur,tonsillectomy





- Social History


Smoking Status: Former Smoker





- Medications


Home Medications: 


                                Home Medications











 Medication  Instructions  Recorded  Confirmed  Last Taken  Type


 


Lisinopril [Zestril TAB] 20 mg PO PRN PRN 07/30/18 09/14/18 09/16/18 History


 


Multivitamin [Multiple Vitamins] 1 each PO DAILY 07/30/18 09/17/18 09/13/18 

History


 


AtorvaSTATin [Lipitor] 20 mg PO QHS #30 tab 07/31/18 09/14/18 09/16/18 Rx


 


Acetaminophen [Tylenol Extra 500 mg PO PRN PRN 09/14/18 09/17/18 09/14/18 

History





Strength]     


 


Aspirin EC 81 mg PO QDAY 09/14/18 09/14/18 09/13/18 History


 


Cholecalciferol (Vitamin D3) 1,000 unit PO DAILY 09/14/18 09/14/18 09/13/18 

History





[Vitamin D3]     


 


Cranberry Conc/Ascorbic Acid 1 each PO DAILY 09/14/18 09/14/18 09/13/18 History





[Cranberry Concentrate Softgel]     


 


Diazepam [Valium] 5 mg PO PRN PRN 09/14/18 09/17/18 09/12/18 History


 


Diclofenac Dr [Voltaren Dr] 75 mg PO DAILY 09/14/18 09/14/18 09/13/18 History


 


Omega-3/Dha/Epa/Fish Oil [Fish Oil 1 cap PO DAILY 09/14/18 09/14/18 09/13/18 

History





1,000 mg Softgel]     


 


Vitamin B Complex [Super B-50 1 each PO DAILY 09/14/18 09/14/18 09/13/18 History





Complex]     














ED Physical Exam





- General


General appearance: alert, in no apparent distress





- Head


Head exam: Present: atraumatic, normocephalic, normal inspection





- Eye


Eye exam: Present: normal appearance, PERRL


Pupils: Present: normal accommodation





- ENT


ENT exam: Present: mucous membranes moist, other (lower lip swelling)





- Neck


Neck exam: Present: normal inspection, full ROM.  Absent: tenderness, 

meningismus, lymphadenopathy, thyromegaly





- Respiratory


Respiratory exam: Present: normal lung sounds bilaterally





- Cardiovascular


Cardiovascular Exam: Present: regular rate, normal rhythm, normal heart sounds





- GI/Abdominal


GI/Abdominal exam: Present: soft, normal bowel sounds.  Absent: distended, 

tenderness, guarding, rebound, rigid, organomegaly, mass, bruit, pulsatile mass,

hernia





- Extremities Exam


Extremities exam: Present: normal inspection, full ROM, normal capillary refill.

 Absent: calf tenderness





- Back Exam


Back exam: Present: normal inspection, full ROM.  Absent: CVA tenderness (R), 

CVA tenderness (L), muscle spasm, paraspinal tenderness, vertebral tenderness





- Neurological Exam


Neurological exam: Present: alert, oriented X3, CN II-XII intact





- Psychiatric


Psychiatric exam: Present: normal mood





- Skin


Skin exam: Present: warm, intact, normal color





ED Course


                                   Vital Signs











  07/17/19 07/17/19 07/17/19





  23:06 23:16 23:28


 


Temperature   98.2 F


 


Pulse Rate 74 69 68


 


Respiratory 21 18 18





Rate   


 


Blood Pressure   


 


O2 Sat by Pulse 98 99 98





Oximetry   














  07/17/19 07/17/19 07/17/19





  23:30 23:32 23:45


 


Temperature   


 


Pulse Rate 66  64


 


Respiratory 11 L 16 10 L





Rate   


 


Blood Pressure 136/69  136/69


 


O2 Sat by Pulse 99  99





Oximetry   














  07/18/19





  00:00


 


Temperature 


 


Pulse Rate 64


 


Respiratory 11 L





Rate 


 


Blood Pressure 145/74


 


O2 Sat by Pulse 98





Oximetry 














ED Medical Decision Making





- Lab Data


Result diagrams: 


                                 07/17/19 23:41





                                 07/17/19 23:41





- Medical Decision Making





Patient is 73 years old male with history of CVA, hypertension and diabetes.  

Patient presented to the ER complaining of lower lip swelling started this 

evening.  Patient denied any shortness of breath or difficulty swallowing.  

Patient stated the symptoms started after he started drinking his fluid 

preparation for his colonoscopy tomorrow.  Patient also taking lisinopril for 

hypertension.  Patient is alert, oriented and in no acute distress.





patient stated that he feel much better. Patient evaluated by me multiple 

times.Patient observed in the ER, patient denied any SOB.


Patient advised to follow-up with his PCP in the next 2-3 days and to return to 

the ER if symptoms get worse.


Critical care attestation.: 


If time is entered above; I have spent that time in minutes in the direct care 

of this critically ill patient, excluding procedure time.








ED Disposition


Clinical Impression: 


 Angio-edema





Disposition: DC-01 TO HOME OR SELFCARE


Is pt being admited?: No


Condition: Stable


Instructions:  Angioedema (ED)

## 2019-07-18 VITALS — DIASTOLIC BLOOD PRESSURE: 77 MMHG | SYSTOLIC BLOOD PRESSURE: 151 MMHG

## 2019-07-18 LAB
BASOPHILS # (AUTO): 0.1 K/MM3 (ref 0–0.1)
BASOPHILS NFR BLD AUTO: 0.9 % (ref 0–1.8)
BUN SERPL-MCNC: 9 MG/DL (ref 9–20)
BUN/CREAT SERPL: 13 %
CALCIUM SERPL-MCNC: 8.7 MG/DL (ref 8.4–10.2)
EOSINOPHIL # BLD AUTO: 0.1 K/MM3 (ref 0–0.4)
EOSINOPHIL NFR BLD AUTO: 1.2 % (ref 0–4.3)
HCT VFR BLD CALC: 42.2 % (ref 35.5–45.6)
HEMOLYSIS INDEX: 7
HGB BLD-MCNC: 15.2 GM/DL (ref 11.8–15.2)
LYMPHOCYTES # BLD AUTO: 2.3 K/MM3 (ref 1.2–5.4)
LYMPHOCYTES NFR BLD AUTO: 29.5 % (ref 13.4–35)
MCH RBC QN AUTO: 33 PG (ref 28–32)
MCHC RBC AUTO-ENTMCNC: 36 % (ref 32–34)
MCV RBC AUTO: 91 FL (ref 84–94)
MONOCYTES # (AUTO): 0.5 K/MM3 (ref 0–0.8)
MONOCYTES % (AUTO): 6.6 % (ref 0–7.3)
PLATELET # BLD: 189 K/MM3 (ref 140–440)
RBC # BLD AUTO: 4.64 M/MM3 (ref 3.65–5.03)

## 2021-11-21 ENCOUNTER — HOSPITAL ENCOUNTER (EMERGENCY)
Dept: HOSPITAL 5 - ED | Age: 76
Discharge: HOME | End: 2021-11-21
Payer: MEDICARE

## 2021-11-21 VITALS — DIASTOLIC BLOOD PRESSURE: 85 MMHG | SYSTOLIC BLOOD PRESSURE: 161 MMHG

## 2021-11-21 DIAGNOSIS — B35.4: Primary | ICD-10-CM

## 2021-11-21 DIAGNOSIS — I10: ICD-10-CM

## 2021-11-21 DIAGNOSIS — Z86.13: ICD-10-CM

## 2021-11-21 DIAGNOSIS — E78.5: ICD-10-CM

## 2021-11-21 PROCEDURE — 96372 THER/PROPH/DIAG INJ SC/IM: CPT

## 2021-11-21 PROCEDURE — 99282 EMERGENCY DEPT VISIT SF MDM: CPT

## 2021-11-21 NOTE — EMERGENCY DEPARTMENT REPORT
ED Rash HPI





- HPI


Chief Complaint: Allergic Reaction


Stated Complaint: ALLERGIC REACTION


Time Seen by Provider: 11/21/21 10:04


Duration: 2 Days


Location: Back, Abdomen, Lower Extremities


Rash Symptoms: Yes Itching, No Facial Swelling, No Tongue/Oral Swelling, No 

Breathing Difficulties, No Choking Sensation, No Wheezing/Dyspnea, No Peeling, 

No Blistering, No Fever, No Lightheaded, No Malaise, No Myalgias


Severity: mild


Other History: Chief complaint: Rash.  HPI: This is a 76-year-old male with 

history of TIA, CVA, hypertension, kidney stones, hyperlipidemia who presents 

with rash Saturday.  Rash began at his waist.  Now has spread to his buttocks 

back and legs.  He has significant itching.  Ran out of Benadryl.  He denies 

shortness of breath or lip swelling.  Patient has similar rash under armpits 

previously.  PCP Dr. Kelsey prescribed cortisone cream.





ED Review of Systems


ROS: 


Stated complaint: ALLERGIC REACTION


Other details as noted in HPI





Comment: All other systems reviewed and negative


Constitutional: denies: fever, malaise


Respiratory: denies: cough, shortness of breath


Cardiovascular: denies: chest pain


Gastrointestinal: denies: abdominal pain, nausea, vomiting


Skin: rash, lesions





ED Past Medical Hx





- Past Medical History


Previous Medical History?: Yes


Hx Hypertension: Yes (X 8 YRS; hyperlipidemia)


Hx CVA: Yes (2008- mild weakness right side)


Hx Heart Attack/AMI: No


Hx Diabetes: No


Hx Liver Disease: No


Hx Renal Disease: No


Hx Sickle Cell Disease: No


Hx Arthritis: Yes


Hx Kidney Stones: Yes


Hx Asthma: No


Hx COPD: No


Hx HIV: No


Additional medical history: deaf right ear,hearing aid left ear





- Surgical History


Past Surgical History?: Yes


Hx Pacemaker: No


Hx Internal Defibrillator: No


Additional Surgical History: left femur,tonsillectomy/ LEFT KNEE REPLACEMENT





- Social History


Smoking Status: Never Smoker


Substance Use Type: None





- Medications


Home Medications: 


                                Home Medications











 Medication  Instructions  Recorded  Confirmed  Last Taken  Type


 


Multivitamin [Multiple Vitamins] 1 each PO DAILY 07/30/18 09/17/18 09/13/18 

History


 


lisinopriL [Zestril TAB] 20 mg PO PRN PRN 07/30/18 09/14/18 09/16/18 History


 


AtorvaSTATin [Lipitor] 20 mg PO QHS #30 tab 07/31/18 09/14/18 09/16/18 Rx


 


Acetaminophen [Tylenol Extra 500 mg PO PRN PRN 09/14/18 09/17/18 09/14/18 

History





Strength]     


 


Aspirin EC [Ecotrin] 81 mg PO QDAY 09/14/18 09/14/18 09/13/18 History


 


Cholecalciferol (Vitamin D3) 1,000 unit PO DAILY 09/14/18 09/14/18 09/13/18 

History





[Vitamin D3]     


 


Cranberry Conc/Ascorbic Acid 1 each PO DAILY 09/14/18 09/14/18 09/13/18 History





[Cranberry Concentrate Softgel]     


 


Diazepam [Valium] 5 mg PO PRN PRN 09/14/18 09/17/18 09/12/18 History


 


Diclofenac Dr [Voltaren Dr] 75 mg PO DAILY 09/14/18 09/14/18 09/13/18 History


 


Omega-3/Dha/Epa/Fish Oil [Fish Oil 1 cap PO DAILY 09/14/18 09/14/18 09/13/18 

History





1,000 mg Softgel]     


 


Vitamin B Complex [Super B-50 1 each PO DAILY 09/14/18 09/14/18 09/13/18 History





Complex]     


 


Famotidine [Pepcid] 40 mg PO QHS #5 tablet 07/18/19  Unknown Rx


 


Prednisone [predniSONE 10 mg 10 mg PO .TAPER #1 tab.ds.pk 07/18/19  Unknown Rx





(6-Day Pack, 21 Tabs)]     


 


diphenhydrAMINE [Benadryl CAP] 25 mg PO Q8HR PRN #20 capsule 07/18/19  Unknown 

Rx


 


Clotrimazole [Antifungal] 1 applic TP BID 28 Days #1 11/21/21  Unknown Rx





 cream..g.    


 


hydrOXYzine PAMOATE [Vistaril] 25 mg PO Q6HR PRN #30 capsule 11/21/21  Unknown 

Rx














Rash Exam





- Exam


General: 


Vital signs noted. No distress. Alert and acting appropriately.





HEENT: No Periorbital Edema, No Conjuctival Injection, No Chemosis, No Perioral 

Edema, No Tongue Edema, No Uvular Edema, No Compromised Airway, No Drooling


Lungs: No Wheezes


Skin: Yes Erythema (Erythema circular rash with central clearing different sizes

 of lesions involving abdomen back lower extremity)





ED Course


                                   Vital Signs











  11/21/21





  09:52


 


Temperature 97.9 F


 


Pulse Rate 92 H


 


Respiratory 20





Rate 


 


Blood Pressure 161/85


 


O2 Sat by Pulse 98





Oximetry 














ED Medical Decision Making





- Medical Decision Making





Clinical impression tinea corporis: Clotrimazole for 2 to 4 weeks, patient 

received IM Decadron and p.o. Vistaril for symptom relief.  Referred to PCP for


Critical care attestation.: 


If time is entered above; I have spent that time in minutes in the direct care 

of this critically ill patient, excluding procedure time.








ED Disposition


Clinical Impression: 


 Tinea corporis





Disposition: 01 HOME / SELF CARE / HOMELESS


Is pt being admited?: No


Does the pt Need Aspirin: No


Condition: Stable


Instructions:  Body Ringworm


Prescriptions: 


Clotrimazole [Antifungal] 1 applic TP BID 28 Days #1 cream..g.


hydrOXYzine PAMOATE [Vistaril] 25 mg PO Q6HR PRN #30 capsule


 PRN Reason: Itching


Referrals: 


GIOVANI KELSEY MD [Staff Physician] - as needed